# Patient Record
Sex: FEMALE | Race: WHITE | Employment: FULL TIME | URBAN - METROPOLITAN AREA
[De-identification: names, ages, dates, MRNs, and addresses within clinical notes are randomized per-mention and may not be internally consistent; named-entity substitution may affect disease eponyms.]

---

## 2018-06-13 ENCOUNTER — ANESTHESIA EVENT (OUTPATIENT)
Dept: OPERATING ROOM | Age: 38
End: 2018-06-13
Payer: COMMERCIAL

## 2018-06-13 ASSESSMENT — LIFESTYLE VARIABLES: SMOKING_STATUS: 1

## 2018-06-14 ENCOUNTER — HOSPITAL ENCOUNTER (OUTPATIENT)
Age: 38
Setting detail: OUTPATIENT SURGERY
Discharge: HOME | End: 2018-06-14
Attending: ORTHOPAEDIC SURGERY | Admitting: ORTHOPAEDIC SURGERY
Payer: COMMERCIAL

## 2018-06-14 ENCOUNTER — ANESTHESIA (OUTPATIENT)
Dept: OPERATING ROOM | Age: 38
End: 2018-06-14
Payer: COMMERCIAL

## 2018-06-14 VITALS
SYSTOLIC BLOOD PRESSURE: 128 MMHG | TEMPERATURE: 98 F | DIASTOLIC BLOOD PRESSURE: 75 MMHG | WEIGHT: 237 LBS | OXYGEN SATURATION: 98 % | BODY MASS INDEX: 40.46 KG/M2 | HEART RATE: 60 BPM | HEIGHT: 64 IN | RESPIRATION RATE: 16 BRPM

## 2018-06-14 VITALS
DIASTOLIC BLOOD PRESSURE: 61 MMHG | SYSTOLIC BLOOD PRESSURE: 109 MMHG | OXYGEN SATURATION: 95 % | TEMPERATURE: 98.6 F | RESPIRATION RATE: 15 BRPM

## 2018-06-14 DIAGNOSIS — M65.341 TRIGGER RING FINGER OF RIGHT HAND: Primary | ICD-10-CM

## 2018-06-14 PROBLEM — M65.349 TRIGGER RING FINGER: Status: ACTIVE | Noted: 2018-06-14

## 2018-06-14 PROCEDURE — 7100000010 HC PHASE II RECOVERY - FIRST 15 MIN: Performed by: ORTHOPAEDIC SURGERY

## 2018-06-14 PROCEDURE — 2709999900 HC NON-CHARGEABLE SUPPLY: Performed by: ORTHOPAEDIC SURGERY

## 2018-06-14 PROCEDURE — 7100000011 HC PHASE II RECOVERY - ADDTL 15 MIN: Performed by: ORTHOPAEDIC SURGERY

## 2018-06-14 PROCEDURE — 3700000000 HC ANESTHESIA ATTENDED CARE: Performed by: ORTHOPAEDIC SURGERY

## 2018-06-14 PROCEDURE — 6370000000 HC RX 637 (ALT 250 FOR IP): Performed by: ANESTHESIOLOGY

## 2018-06-14 PROCEDURE — 3600000002 HC SURGERY LEVEL 2 BASE: Performed by: ORTHOPAEDIC SURGERY

## 2018-06-14 PROCEDURE — 2500000003 HC RX 250 WO HCPCS: Performed by: NURSE ANESTHETIST, CERTIFIED REGISTERED

## 2018-06-14 PROCEDURE — 6360000002 HC RX W HCPCS: Performed by: NURSE ANESTHETIST, CERTIFIED REGISTERED

## 2018-06-14 PROCEDURE — 2580000003 HC RX 258: Performed by: ANESTHESIOLOGY

## 2018-06-14 PROCEDURE — 3600000012 HC SURGERY LEVEL 2 ADDTL 15MIN: Performed by: ORTHOPAEDIC SURGERY

## 2018-06-14 PROCEDURE — 3700000001 HC ADD 15 MINUTES (ANESTHESIA): Performed by: ORTHOPAEDIC SURGERY

## 2018-06-14 RX ORDER — FENTANYL CITRATE 50 UG/ML
25 INJECTION, SOLUTION INTRAMUSCULAR; INTRAVENOUS EVERY 5 MIN PRN
Status: DISCONTINUED | OUTPATIENT
Start: 2018-06-14 | End: 2018-06-14 | Stop reason: HOSPADM

## 2018-06-14 RX ORDER — ACETAMINOPHEN AND CODEINE PHOSPHATE 300; 30 MG/1; MG/1
1 TABLET ORAL EVERY 4 HOURS PRN
Qty: 30 TABLET | Refills: 1 | Status: SHIPPED | OUTPATIENT
Start: 2018-06-14 | End: 2018-06-24

## 2018-06-14 RX ORDER — LIDOCAINE HYDROCHLORIDE 5 MG/ML
INJECTION, SOLUTION INFILTRATION; INTRAVENOUS PRN
Status: DISCONTINUED | OUTPATIENT
Start: 2018-06-14 | End: 2018-06-14 | Stop reason: SDUPTHER

## 2018-06-14 RX ORDER — MIDAZOLAM HYDROCHLORIDE 1 MG/ML
INJECTION INTRAMUSCULAR; INTRAVENOUS PRN
Status: DISCONTINUED | OUTPATIENT
Start: 2018-06-14 | End: 2018-06-14 | Stop reason: SDUPTHER

## 2018-06-14 RX ORDER — PROMETHAZINE HYDROCHLORIDE 25 MG/ML
6.25 INJECTION, SOLUTION INTRAMUSCULAR; INTRAVENOUS
Status: DISCONTINUED | OUTPATIENT
Start: 2018-06-14 | End: 2018-06-14 | Stop reason: HOSPADM

## 2018-06-14 RX ORDER — FENTANYL CITRATE 50 UG/ML
50 INJECTION, SOLUTION INTRAMUSCULAR; INTRAVENOUS EVERY 5 MIN PRN
Status: DISCONTINUED | OUTPATIENT
Start: 2018-06-14 | End: 2018-06-14 | Stop reason: HOSPADM

## 2018-06-14 RX ORDER — KETOROLAC TROMETHAMINE 30 MG/ML
INJECTION, SOLUTION INTRAMUSCULAR; INTRAVENOUS PRN
Status: DISCONTINUED | OUTPATIENT
Start: 2018-06-14 | End: 2018-06-14 | Stop reason: SDUPTHER

## 2018-06-14 RX ORDER — LIDOCAINE HYDROCHLORIDE 20 MG/ML
INJECTION, SOLUTION INFILTRATION; PERINEURAL PRN
Status: DISCONTINUED | OUTPATIENT
Start: 2018-06-14 | End: 2018-06-14 | Stop reason: SDUPTHER

## 2018-06-14 RX ORDER — MEPERIDINE HYDROCHLORIDE 25 MG/ML
12.5 INJECTION INTRAMUSCULAR; INTRAVENOUS; SUBCUTANEOUS EVERY 5 MIN PRN
Status: DISCONTINUED | OUTPATIENT
Start: 2018-06-14 | End: 2018-06-14 | Stop reason: HOSPADM

## 2018-06-14 RX ORDER — PROPOFOL 10 MG/ML
INJECTION, EMULSION INTRAVENOUS CONTINUOUS PRN
Status: DISCONTINUED | OUTPATIENT
Start: 2018-06-14 | End: 2018-06-14 | Stop reason: SDUPTHER

## 2018-06-14 RX ORDER — ACETAMINOPHEN AND CODEINE PHOSPHATE 300; 30 MG/1; MG/1
1 TABLET ORAL
Status: COMPLETED | OUTPATIENT
Start: 2018-06-14 | End: 2018-06-14

## 2018-06-14 RX ORDER — FENTANYL CITRATE 50 UG/ML
INJECTION, SOLUTION INTRAMUSCULAR; INTRAVENOUS PRN
Status: DISCONTINUED | OUTPATIENT
Start: 2018-06-14 | End: 2018-06-14 | Stop reason: SDUPTHER

## 2018-06-14 RX ORDER — TRAMADOL HYDROCHLORIDE 50 MG/1
50 TABLET ORAL EVERY 6 HOURS PRN
Status: DISCONTINUED | OUTPATIENT
Start: 2018-06-14 | End: 2018-06-14 | Stop reason: HOSPADM

## 2018-06-14 RX ORDER — OXYCODONE HYDROCHLORIDE AND ACETAMINOPHEN 5; 325 MG/1; MG/1
1 TABLET ORAL
Status: DISCONTINUED | OUTPATIENT
Start: 2018-06-14 | End: 2018-06-14 | Stop reason: HOSPADM

## 2018-06-14 RX ORDER — SODIUM CHLORIDE, SODIUM LACTATE, POTASSIUM CHLORIDE, CALCIUM CHLORIDE 600; 310; 30; 20 MG/100ML; MG/100ML; MG/100ML; MG/100ML
INJECTION, SOLUTION INTRAVENOUS CONTINUOUS
Status: DISCONTINUED | OUTPATIENT
Start: 2018-06-14 | End: 2018-06-14 | Stop reason: HOSPADM

## 2018-06-14 RX ADMIN — LIDOCAINE HYDROCHLORIDE 25 MG: 20 INJECTION, SOLUTION INFILTRATION; PERINEURAL at 09:30

## 2018-06-14 RX ADMIN — MIDAZOLAM HYDROCHLORIDE 2 MG: 1 INJECTION INTRAMUSCULAR; INTRAVENOUS at 09:21

## 2018-06-14 RX ADMIN — FENTANYL CITRATE 50 MCG: 50 INJECTION, SOLUTION INTRAMUSCULAR; INTRAVENOUS at 09:25

## 2018-06-14 RX ADMIN — LIDOCAINE HYDROCHLORIDE 50 ML: 5 INJECTION, SOLUTION INFILTRATION; INTRAVENOUS at 09:26

## 2018-06-14 RX ADMIN — FENTANYL CITRATE 50 MCG: 50 INJECTION, SOLUTION INTRAMUSCULAR; INTRAVENOUS at 09:30

## 2018-06-14 RX ADMIN — SODIUM CHLORIDE, POTASSIUM CHLORIDE, SODIUM LACTATE AND CALCIUM CHLORIDE: 600; 310; 30; 20 INJECTION, SOLUTION INTRAVENOUS at 08:30

## 2018-06-14 RX ADMIN — ACETAMINOPHEN AND CODEINE PHOSPHATE 1 TABLET: 300; 30 TABLET ORAL at 10:15

## 2018-06-14 RX ADMIN — KETOROLAC TROMETHAMINE 30 MG: 30 INJECTION, SOLUTION INTRAMUSCULAR at 09:49

## 2018-06-14 RX ADMIN — PROPOFOL 100 MCG/KG/MIN: 10 INJECTION, EMULSION INTRAVENOUS at 09:30

## 2018-06-14 ASSESSMENT — PULMONARY FUNCTION TESTS
PIF_VALUE: 0

## 2018-06-14 ASSESSMENT — PAIN SCALES - GENERAL
PAINLEVEL_OUTOF10: 0
PAINLEVEL_OUTOF10: 6

## 2018-06-14 ASSESSMENT — PAIN DESCRIPTION - ORIENTATION
ORIENTATION: RIGHT

## 2018-06-14 ASSESSMENT — PAIN DESCRIPTION - PAIN TYPE
TYPE: SURGICAL PAIN

## 2018-06-14 ASSESSMENT — PAIN DESCRIPTION - DESCRIPTORS
DESCRIPTORS: ACHING
DESCRIPTORS: ACHING
DESCRIPTORS: ACHING;DISCOMFORT
DESCRIPTORS: ACHING

## 2018-06-14 ASSESSMENT — PAIN - FUNCTIONAL ASSESSMENT: PAIN_FUNCTIONAL_ASSESSMENT: 0-10

## 2018-06-14 ASSESSMENT — PAIN DESCRIPTION - LOCATION
LOCATION: HAND

## 2019-04-04 ENCOUNTER — HOSPITAL ENCOUNTER (EMERGENCY)
Age: 39
Discharge: HOME | End: 2019-04-04
Attending: EMERGENCY MEDICINE
Payer: COMMERCIAL

## 2019-04-04 VITALS
OXYGEN SATURATION: 96 % | BODY MASS INDEX: 40.85 KG/M2 | TEMPERATURE: 97.3 F | RESPIRATION RATE: 18 BRPM | SYSTOLIC BLOOD PRESSURE: 134 MMHG | HEART RATE: 79 BPM | WEIGHT: 238 LBS | DIASTOLIC BLOOD PRESSURE: 83 MMHG

## 2019-04-04 DIAGNOSIS — S09.90XA INJURY OF HEAD, INITIAL ENCOUNTER: Primary | ICD-10-CM

## 2019-04-04 PROCEDURE — 99282 EMERGENCY DEPT VISIT SF MDM: CPT

## 2019-04-04 PROCEDURE — 6370000000 HC RX 637 (ALT 250 FOR IP): Performed by: EMERGENCY MEDICINE

## 2019-04-04 RX ORDER — ACETAMINOPHEN 500 MG
1000 TABLET ORAL ONCE
Status: COMPLETED | OUTPATIENT
Start: 2019-04-04 | End: 2019-04-04

## 2019-04-04 RX ADMIN — ACETAMINOPHEN 1000 MG: 500 TABLET, FILM COATED ORAL at 11:24

## 2019-04-04 ASSESSMENT — ENCOUNTER SYMPTOMS
WHEEZING: 0
SINUS PRESSURE: 0
COUGH: 0
EYE DISCHARGE: 0
SHORTNESS OF BREATH: 0
ABDOMINAL DISTENTION: 0
VOMITING: 0
EYE REDNESS: 0
DOUBLE VISION: 0
BACK PAIN: 0
EYE PAIN: 0
BLURRED VISION: 0
DIARRHEA: 0
NAUSEA: 0
SORE THROAT: 0
DIFFICULTY BREATHING: 0

## 2019-04-04 ASSESSMENT — PAIN - FUNCTIONAL ASSESSMENT: PAIN_FUNCTIONAL_ASSESSMENT: PREVENTS OR INTERFERES SOME ACTIVE ACTIVITIES AND ADLS

## 2019-04-04 ASSESSMENT — PAIN SCALES - GENERAL
PAINLEVEL_OUTOF10: 7
PAINLEVEL_OUTOF10: 7

## 2019-04-04 ASSESSMENT — PAIN DESCRIPTION - PROGRESSION
CLINICAL_PROGRESSION: NOT CHANGED
CLINICAL_PROGRESSION: NOT CHANGED

## 2019-04-04 ASSESSMENT — PAIN DESCRIPTION - FREQUENCY: FREQUENCY: CONTINUOUS

## 2019-04-04 ASSESSMENT — PAIN DESCRIPTION - PAIN TYPE: TYPE: ACUTE PAIN

## 2019-04-04 ASSESSMENT — PAIN DESCRIPTION - LOCATION: LOCATION: HEAD

## 2019-04-04 ASSESSMENT — PAIN DESCRIPTION - DESCRIPTORS: DESCRIPTORS: ACHING;HEADACHE

## 2019-04-04 ASSESSMENT — PAIN DESCRIPTION - ORIENTATION: ORIENTATION: RIGHT;POSTERIOR

## 2019-04-04 NOTE — ED PROVIDER NOTES
The history is provided by the patient. Head Injury   Location:  R parietal  Time since incident:  1 day  Mechanism of injury: direct blow    Pain details:     Quality:  Aching and throbbing    Severity:  Moderate  Chronicity:  New  Associated symptoms: headache    Associated symptoms: no blurred vision, no difficulty breathing, no disorientation, no double vision, no hearing loss, no loss of consciousness, no memory loss, no nausea, no neck pain, no numbness, no seizures and no vomiting        Review of Systems   Constitutional: Negative for chills and fever. HENT: Negative for ear pain, hearing loss, sinus pressure and sore throat. Eyes: Negative for blurred vision, double vision, pain, discharge and redness. Respiratory: Negative for cough, shortness of breath and wheezing. Cardiovascular: Negative for chest pain. Gastrointestinal: Negative for abdominal distention, diarrhea, nausea and vomiting. Genitourinary: Negative for dysuria and frequency. Musculoskeletal: Negative for arthralgias, back pain and neck pain. Skin: Negative for rash and wound. Neurological: Positive for headaches. Negative for seizures, loss of consciousness, weakness and numbness. Hematological: Negative for adenopathy. Psychiatric/Behavioral: Negative for memory loss. All other systems reviewed and are negative. Physical Exam   Constitutional: She is oriented to person, place, and time. She appears well-developed and well-nourished. HENT:   Head: Normocephalic. Head is with contusion. Eyes: Pupils are equal, round, and reactive to light. Neck: Normal range of motion. Neck supple. Cardiovascular: Normal rate, regular rhythm and normal heart sounds. No murmur heard. Pulmonary/Chest: Effort normal and breath sounds normal. No respiratory distress. She has no wheezes. She has no rales. Abdominal: Soft. Bowel sounds are normal. There is no tenderness. There is no rebound and no guarding. Musculoskeletal: She exhibits no edema. Neurological: She is alert and oriented to person, place, and time. No cranial nerve deficit. Coordination normal.   Skin: Skin is warm and dry. Nursing note and vitals reviewed. Procedures    [facility]             --------------------------------------------- PAST HISTORY ---------------------------------------------  Past Medical History:  has a past medical history of H/O dental abscess. Past Surgical History:  has a past surgical history that includes  section; Dental surgery; other surgical history (Right, 2018); and pr incise finger tendon sheath (Right, 2018). Social History:  reports that she has been smoking cigarettes. She has been smoking about 1.00 pack per day. She has never used smokeless tobacco. She reports that she does not drink alcohol or use drugs. Family History: family history is not on file. The patients home medications have been reviewed. Allergies: Bactrim [sulfamethoxazole-trimethoprim]; Other; Penicillins; Ciprofloxacin; and Keflex [cephalexin]    -------------------------------------------------- RESULTS -------------------------------------------------  Labs:  No results found for this visit on 19. Radiology:  No orders to display       ------------------------- NURSING NOTES AND VITALS REVIEWED ---------------------------  Date / Time Roomed:  2019 10:53 AM  ED Bed Assignment:      The nursing notes within the ED encounter and vital signs as below have been reviewed.    /83   Pulse 79   Temp 97.3 °F (36.3 °C) (Oral)   Resp 18   Wt 238 lb (108 kg)   LMP 2019   SpO2 96%   BMI 40.85 kg/m²   Oxygen Saturation Interpretation: Normal      ------------------------------------------ PROGRESS NOTES ------------------------------------------  I have spoken with the patient and discussed todays results, in addition to providing specific details for the plan of care and counseling

## 2019-04-04 NOTE — LETTER
[facility] [address] [place] [address] Emergency Department  [address] [address]  Phone: [phone]               April 4, 2019    Patient: Rohan Rhodes   YOB: 1980   Date of Visit: 4/4/2019       To Whom It May Concern:    Rohan Rhodes was seen and treated in our emergency department on 4/4/2019. She may return to work on 4/8/2019.       Sincerely,       Nolon Carrel, RN         Signature:__________________________________

## 2022-12-29 ENCOUNTER — OFFICE VISIT (OUTPATIENT)
Dept: ORTHOPEDIC SURGERY | Age: 42
End: 2022-12-29
Payer: COMMERCIAL

## 2022-12-29 VITALS — HEIGHT: 64 IN | WEIGHT: 228 LBS | TEMPERATURE: 98 F | BODY MASS INDEX: 38.93 KG/M2

## 2022-12-29 DIAGNOSIS — S82.891A CLOSED FRACTURE OF RIGHT ANKLE, INITIAL ENCOUNTER: Primary | ICD-10-CM

## 2022-12-29 DIAGNOSIS — S93.401A SPRAIN OF RIGHT ANKLE, UNSPECIFIED LIGAMENT, INITIAL ENCOUNTER: ICD-10-CM

## 2022-12-29 PROCEDURE — G8484 FLU IMMUNIZE NO ADMIN: HCPCS | Performed by: ORTHOPAEDIC SURGERY

## 2022-12-29 PROCEDURE — 4004F PT TOBACCO SCREEN RCVD TLK: CPT | Performed by: ORTHOPAEDIC SURGERY

## 2022-12-29 PROCEDURE — 99203 OFFICE O/P NEW LOW 30 MIN: CPT | Performed by: ORTHOPAEDIC SURGERY

## 2022-12-29 PROCEDURE — G8417 CALC BMI ABV UP PARAM F/U: HCPCS | Performed by: ORTHOPAEDIC SURGERY

## 2022-12-29 PROCEDURE — G8427 DOCREV CUR MEDS BY ELIG CLIN: HCPCS | Performed by: ORTHOPAEDIC SURGERY

## 2022-12-29 RX ORDER — ASPIRIN 81 MG/1
81 TABLET ORAL DAILY
Qty: 30 TABLET | Refills: 0 | Status: SHIPPED | OUTPATIENT
Start: 2022-12-29

## 2022-12-29 NOTE — PROGRESS NOTES
Chief Complaint   Patient presents with    Ankle Pain       Alfredo Baca is a 38 y.o. female who presents today with a right ankle injury. The injury occurred 2022. Patient reports: Overusing her ankle and waking up the next morning and pain. The patient complains of pain over the medial side. The intensity is 4/10. The pain is described as: dull. Previous treatment includes:rest, ice, heat, NSAIDs, HEP without much relief.         Past Medical History:   Diagnosis Date    H/O dental abscess      Past Surgical History:   Procedure Laterality Date     SECTION      DENTAL SURGERY      OTHER SURGICAL HISTORY Right 2018    tenovaginotomy, right ring finger    VT INCISE FINGER TENDON SHEATH Right 2018    TENOVAGINOTOMY RIGHT RING FINGER performed by Keaton Liu DO at [address] [place] [address]       Current Outpatient Medications:     aspirin EC 81 MG EC tablet, Take 1 tablet by mouth daily, Disp: 30 tablet, Rfl: 0    escitalopram (LEXAPRO) 20 MG tablet, Take 20 mg by mouth daily, Disp: , Rfl:   Allergies   Allergen Reactions    Bactrim [Sulfamethoxazole-Trimethoprim] Anaphylaxis    Other Anaphylaxis     Seafood      Penicillins Anaphylaxis    Ciprofloxacin Hives    Keflex [Cephalexin] Hives     Social History     Socioeconomic History    Marital status:      Spouse name: Not on file    Number of children: Not on file    Years of education: Not on file    Highest education level: Not on file   Occupational History    Not on file   Tobacco Use    Smoking status: Every Day     Packs/day: 1.00     Types: Cigarettes    Smokeless tobacco: Never   Substance and Sexual Activity    Alcohol use: No    Drug use: No    Sexual activity: Yes     Partners: Male   Other Topics Concern    Not on file   Social History Narrative    Not on file     Social Determinants of Health     Financial Resource Strain: Not on file   Food Insecurity: Not on file   Transportation Needs: Not on file   Physical Activity: Not on file   Stress: Not on file   Social Connections: Not on file   Intimate Partner Violence: Not on file   Housing Stability: Not on file     No family history on file. REVIEW OF SYSTEMS:     General/Constitution:  (-)weight loss, (-)fever, (-)chills, (-)weakness. Skin: (-) rash,(-) psoriasis,(-) eczema, (-)skin cancer. Musculoskeletal: (-) fractures,  (-) dislocations,(-) collagen vascular disease, (-) fibromyalgia, (-) multiple sclerosis, (-) muscular dystrophy, (-) RSD,(-) joint pain (-)swelling, (-) joint pain,swelling. Neurologic: (-) epilepsy, (-)seizures,(-) brain tumor,(-) TIA, (-)stroke, (-)headaches, (-)Parkinson disease,(-) memory loss, (-) LOC. Cardiovascular: (-) Chest pain, (-) swelling in legs/feet, (-) SOB, (-) cramping in legs/feet with walking. Respiratory: (-) SOB, (-) Coughing, (-) night sweats. GI: (-) nausea, (-) vomiting, (-) diarrhea, (-) blood in stool, (-) gastric ulcer. Psychiatric: (-) Depression, (-) Anxiety, (-) bipolar disease, (-) Alzheimer's Disease  Allergic/Immunologic: (-) allergies latex, (-) allergies metal, (-) skin sensitivity. Hematlogic: (-) anemia, (-) blood transfusion, (-) DVT/PE, (-) Clotting disorders      EXAM:      Constitution:  Vitals:    12/29/22 1024   Temp: 98 °F (36.7 °C)         Psycihatric:    The patient is alert and oriented x 3, appears to be stated age and in no distress. Respiratory:    Respiratory effort is not labored. Patient is not gasping. Palpation of the chest reveals no tactile fremitus. Skin:    Upon inspection: the skin appears warm, dry and intact. There is not a previous scar over the affected area. There is not any cellulitis, lymphedema or cutaneous lesions noted in the lower extremities. Upon palpation there is no induration noted. Neurologic:      Motor exam of the lower extremities show ; quadriceps, hamstrings, foot dorsi and plantar flexors intact R.  5/5 and L. 5/5.  Deep tendon reflexes are 2/4 at the knees and 2/4 at the ankles with strong extensor hallicus longus motor strength bilaterally. Sensory to both feet is intact to all sensory roots. Cardiovascular: The vascular exam is normal and is well perfused to distal extremities. Distal pulses DP/PT: R. 2+; L. 2+. There is cap refill noted less than two seconds in all digits. There is not edema of the bilateral lower extremities. There is not varicosities noted in the distal extremities. Lymph:    Upon palpation,  there is no lymphadenopathy noted in bilateral lower extremities. Musculoskeletal:    Gait: antalgic; examination of the nails and digits reveal no cyanosis or clubbing. Knee exam - bilateral knee exam shows;  range of motion of R. Knee is 0 to 140, and L. Knee is 0 to 140. The patient does not have  pain on motion, there is not an effusion, there is not tenderness over the  global region, there are not any masses, there is not ligamentous instability, there is not  deformity noted. Knee exam: the injured knee reveals normal exam, no swelling, tenderness, instability; ligaments intact, FROM. Ankle Exam:    Upon inspection and palpation of the Right ankle,  there is not deformity noted,  moderate swelling, moderate ecchymosis, has pain on palpation of right medial malleolus. ROM R: Limited Secondary to pain; Left ankle : DF20; PF 40;  INV 30, DEMOND 10. This exam was compared bilaterally. Right Ankle:   (-) Anterior Drawer ,  (-) Posterior Drawer ,  (-) Squeeze test,  (-) External Rotation, (-) Eversion test , (-) Liz Test     Left ankle:   (-) Anterior Drawer ,(-)  Posterior Drawer ,(-) Squeeze test,(-) External Rotation (-) Eversion test, (-) Liz Test.      Foot exam- visual inspection reveals warm, good capillary refill, there is not pain to palpation over the metatarsals.    ROM inversion/eversion full range of motion, abduction/adduction full range of motion, ROM in MTP/PIP/DIP full range of motion. Xrays:    X-rays of right ankle from outside facility reveal a small avulsion fracture of the medial malleolus      Radiographic findings reviewed with patient      Impression:  Gavi Anderson was seen today for ankle pain. Diagnoses and all orders for this visit:    Closed fracture of right ankle, initial encounter    Sprain of right ankle, unspecified ligament, initial encounter    Other orders  -     aspirin EC 81 MG EC tablet; Take 1 tablet by mouth daily        Patient seen and examined. X-rays reviewed. Natural history and course discussed with patient. Treatment options discussed with patient in detail including risks and benefits. Patient should do well with conservative management at this time. Plan:Natural history and expected course discussed. Questions answered. Rest, ice, compression, elevation (RICE) therapy. Crutches and instructions provided. Educational materials distributed. Fit with long leg cast boot for use over next 2-4 weeks. Home exercise plan outlined. OTC analgesics as needed.   PT referral.  Aspirin for DVT prophylaxis    F/u 2 weeks

## 2023-01-09 DIAGNOSIS — S82.891A CLOSED FRACTURE OF RIGHT ANKLE, INITIAL ENCOUNTER: Primary | ICD-10-CM

## 2023-01-12 ENCOUNTER — OFFICE VISIT (OUTPATIENT)
Dept: ORTHOPEDIC SURGERY | Age: 43
End: 2023-01-12

## 2023-01-12 VITALS — WEIGHT: 228 LBS | BODY MASS INDEX: 38.93 KG/M2 | TEMPERATURE: 98 F | HEIGHT: 64 IN

## 2023-01-12 DIAGNOSIS — S93.401A SPRAIN OF RIGHT ANKLE, UNSPECIFIED LIGAMENT, INITIAL ENCOUNTER: ICD-10-CM

## 2023-01-12 DIAGNOSIS — S82.891A CLOSED FRACTURE OF RIGHT ANKLE, INITIAL ENCOUNTER: Primary | ICD-10-CM

## 2023-01-12 NOTE — PROGRESS NOTES
Chief Complaint   Patient presents with    Ankle Pain     Left ankle fx f/u doing greatSourav Isbell is a 38 y.o. female who presents today with a right ankle injury. The injury occurred 2022. Patient reports: Overusing her ankle and waking up the next morning and pain. The patient complains of pain over the medial side. The intensity is 4/10. The pain is described as: dull. Previous treatment includes:rest, ice, heat, NSAIDs, HEP without much relief. On follow-up today, patient has been compliant with cast boot and has no pain.         Past Medical History:   Diagnosis Date    H/O dental abscess      Past Surgical History:   Procedure Laterality Date     SECTION      DENTAL SURGERY      OTHER SURGICAL HISTORY Right 2018    tenovaginotomy, right ring finger    MO INCISE FINGER TENDON SHEATH Right 2018    TENOVAGINOTOMY RIGHT RING FINGER performed by Jignesh Rubin DO at [address] [place] [address]       Current Outpatient Medications:     aspirin EC 81 MG EC tablet, Take 1 tablet by mouth daily, Disp: 30 tablet, Rfl: 0    escitalopram (LEXAPRO) 20 MG tablet, Take 20 mg by mouth daily, Disp: , Rfl:   Allergies   Allergen Reactions    Bactrim [Sulfamethoxazole-Trimethoprim] Anaphylaxis    Other Anaphylaxis     Seafood      Penicillins Anaphylaxis    Ciprofloxacin Hives    Keflex [Cephalexin] Hives     Social History     Socioeconomic History    Marital status:      Spouse name: Not on file    Number of children: Not on file    Years of education: Not on file    Highest education level: Not on file   Occupational History    Not on file   Tobacco Use    Smoking status: Every Day     Packs/day: 1.00     Types: Cigarettes    Smokeless tobacco: Never   Substance and Sexual Activity    Alcohol use: No    Drug use: No    Sexual activity: Yes     Partners: Male   Other Topics Concern    Not on file   Social History Narrative    Not on file     Social Determinants of Health Financial Resource Strain: Not on file   Food Insecurity: Not on file   Transportation Needs: Not on file   Physical Activity: Not on file   Stress: Not on file   Social Connections: Not on file   Intimate Partner Violence: Not on file   Housing Stability: Not on file     No family history on file. REVIEW OF SYSTEMS:     General/Constitution:  (-)weight loss, (-)fever, (-)chills, (-)weakness. Skin: (-) rash,(-) psoriasis,(-) eczema, (-)skin cancer. Musculoskeletal: (-) fractures,  (-) dislocations,(-) collagen vascular disease, (-) fibromyalgia, (-) multiple sclerosis, (-) muscular dystrophy, (-) RSD,(-) joint pain (-)swelling, (-) joint pain,swelling. Neurologic: (-) epilepsy, (-)seizures,(-) brain tumor,(-) TIA, (-)stroke, (-)headaches, (-)Parkinson disease,(-) memory loss, (-) LOC. Cardiovascular: (-) Chest pain, (-) swelling in legs/feet, (-) SOB, (-) cramping in legs/feet with walking. Respiratory: (-) SOB, (-) Coughing, (-) night sweats. GI: (-) nausea, (-) vomiting, (-) diarrhea, (-) blood in stool, (-) gastric ulcer. Psychiatric: (-) Depression, (-) Anxiety, (-) bipolar disease, (-) Alzheimer's Disease  Allergic/Immunologic: (-) allergies latex, (-) allergies metal, (-) skin sensitivity. Hematlogic: (-) anemia, (-) blood transfusion, (-) DVT/PE, (-) Clotting disorders      EXAM:      Constitution:  Vitals:    01/12/23 0836   Temp: 98 °F (36.7 °C)         Psycihatric:    The patient is alert and oriented x 3, appears to be stated age and in no distress. Respiratory:    Respiratory effort is not labored. Patient is not gasping. Palpation of the chest reveals no tactile fremitus. Skin:    Upon inspection: the skin appears warm, dry and intact. There is not a previous scar over the affected area. There is not any cellulitis, lymphedema or cutaneous lesions noted in the lower extremities. Upon palpation there is no induration noted.           Neurologic:      Motor exam of the lower extremities show ; quadriceps, hamstrings, foot dorsi and plantar flexors intact R.  5/5 and L. 5/5. Deep tendon reflexes are 2/4 at the knees and 2/4 at the ankles with strong extensor hallicus longus motor strength bilaterally. Sensory to both feet is intact to all sensory roots. Cardiovascular: The vascular exam is normal and is well perfused to distal extremities. Distal pulses DP/PT: R. 2+; L. 2+. There is cap refill noted less than two seconds in all digits. There is not edema of the bilateral lower extremities. There is not varicosities noted in the distal extremities. Lymph:    Upon palpation,  there is no lymphadenopathy noted in bilateral lower extremities. Musculoskeletal:    Gait: antalgic; examination of the nails and digits reveal no cyanosis or clubbing. Knee exam - bilateral knee exam shows;  range of motion of R. Knee is 0 to 140, and L. Knee is 0 to 140. The patient does not have  pain on motion, there is not an effusion, there is not tenderness over the  global region, there are not any masses, there is not ligamentous instability, there is not  deformity noted. Knee exam: the injured knee reveals normal exam, no swelling, tenderness, instability; ligaments intact, FROM. Ankle Exam:    Upon inspection and palpation of the Right ankle,  there is not deformity noted, mild swelling, no ecchymosis, has no pain on palpation of right medial malleolus. ROM R: DF20; PF 40;  INV 30, DEMOND 10. This exam was compared bilaterally. Right Ankle:   (-) Anterior Drawer ,  (-) Posterior Drawer ,  (-) Squeeze test,  (-) External Rotation, (-) Eversion test , (-) Liz Test     Left ankle:   (-) Anterior Drawer ,(-)  Posterior Drawer ,(-) Squeeze test,(-) External Rotation (-) Eversion test, (-) Liz Test.      Foot exam- visual inspection reveals warm, good capillary refill, there is not pain to palpation over the metatarsals.    ROM inversion/eversion full range of motion, abduction/adduction full range of motion, ROM in MTP/PIP/DIP full range of motion. Xrays:    X-rays of right ankle from outside facility reveal a small avulsion fracture of the medial malleolus      XR ANKLE RIGHT (MIN 3 VIEWS)    Result Date: 1/12/2023  EXAMINATION: THREE XRAY VIEWS OF THE RIGHT ANKLE 1/12/2023 8:23 am COMPARISON: None used HISTORY: ORDERING SYSTEM PROVIDED HISTORY: Closed fracture of right ankle, initial encounter FINDINGS: Radiographs of the right ankle demonstrate no fractures with preserved alignment. Ankle mortise appears preserved. No abnormality is identified within the talar dome nor within the base of the 5th metatarsal.  Tiny anterior calcaneal enthesophyte. Osseous mineralization is normal. No soft tissue swelling or joint effusion. 1.  No acute osseous findings seen about the right ankle. Normal alignment. 2.  Tiny anterior calcaneal enthesophyte. RECOMMENDATION: In the setting of recent trauma, if there is persistent symptoms and physical exam warrants a repeat radiograph in 10-14 days could be considered as occult fractures may not be evident on initial imaging evaluation. Radiographic findings reviewed with patient      Impression:  Pat Durand was seen today for ankle pain. Diagnoses and all orders for this visit:    Closed fracture of right ankle, initial encounter    Sprain of right ankle, unspecified ligament, initial encounter          Patient seen and examined. X-rays reviewed. Natural history and course discussed with patient. Treatment options discussed with patient in detail including risks and benefits. Patient should do well with conservative management at this time. Plan:Natural history and expected course discussed. Questions answered. Rest, ice, compression, elevation (RICE) therapy. Crutches and instructions provided. Educational materials distributed. Fit with ankle brace for use over next 2-4 weeks. Home exercise plan outlined.   OTC analgesics as needed. In a 15 minute assessment and discussion, patient was provided and fitted for a removable lace up ankle brace distributed and applied. She was educated in detail how to use brace and the importance of use as well as range of motion and HEP exercises.

## 2023-01-21 RX ORDER — ACETAMINOPHEN/DIPHENHYDRAMINE 500MG-25MG
TABLET ORAL
Qty: 30 TABLET | Refills: 0 | OUTPATIENT
Start: 2023-01-21

## 2023-02-08 DIAGNOSIS — S82.891A CLOSED FRACTURE OF RIGHT ANKLE, INITIAL ENCOUNTER: Primary | ICD-10-CM

## 2023-02-13 DIAGNOSIS — S82.891A CLOSED FRACTURE OF RIGHT ANKLE, INITIAL ENCOUNTER: Primary | ICD-10-CM

## 2023-10-02 ENCOUNTER — OFFICE VISIT (OUTPATIENT)
Dept: ORTHOPEDIC SURGERY | Age: 43
End: 2023-10-02
Payer: COMMERCIAL

## 2023-10-02 VITALS — BODY MASS INDEX: 38.93 KG/M2 | HEIGHT: 64 IN | WEIGHT: 228 LBS | TEMPERATURE: 98 F

## 2023-10-02 DIAGNOSIS — S62.112A CLOSED CHIP FRACTURE OF TRIQUETRUM OF LEFT WRIST, INITIAL ENCOUNTER: Primary | ICD-10-CM

## 2023-10-02 PROCEDURE — 4004F PT TOBACCO SCREEN RCVD TLK: CPT | Performed by: ORTHOPAEDIC SURGERY

## 2023-10-02 PROCEDURE — G8417 CALC BMI ABV UP PARAM F/U: HCPCS | Performed by: ORTHOPAEDIC SURGERY

## 2023-10-02 PROCEDURE — G8427 DOCREV CUR MEDS BY ELIG CLIN: HCPCS | Performed by: ORTHOPAEDIC SURGERY

## 2023-10-02 PROCEDURE — 99203 OFFICE O/P NEW LOW 30 MIN: CPT | Performed by: ORTHOPAEDIC SURGERY

## 2023-10-02 PROCEDURE — G8484 FLU IMMUNIZE NO ADMIN: HCPCS | Performed by: ORTHOPAEDIC SURGERY

## 2023-10-02 NOTE — PROGRESS NOTES
Angella Morales is a 37 y.o. female, who presents   Chief Complaint   Patient presents with    Hand Pain     Left hand fx hit it on the floor board while picking up clothes. HPI[de-identified] Injury to left wrist occurred 4 days ago. Jaydon aMckay was doing some laundry and she moved her left hand and hit the bed rail with her left wrist area. She was sore and went to the urgent care at [facility] the next day. She was x-rayed there and  doctor waited for radiologist report reported to her that she had a chip fracture in her wrist.  She is placed in a short arm splint. She is still sore in the area and has some difficulty with finger range of motion. Allergies; medications; past medical, surgical, family, and social history; and problem list have been reviewed today and updated as indicated in this encounter - see below following Ortho specifics. Musculoskeletal: Skin condition gross neurovascular functions good in the left upper extremity. Elbow and shoulder range of motion stability are good without pain. The left wrist is guarded because of pain. There is a little stiffness and swelling. There is no discoloration at this time. Finger range of motion is somewhat limited in composite flexion by her discomfort. She is pliable at the endpoints observed. Radiologic Studies: Imaging from the urgent care shows on the lateral film and ossific density dorsal over the carpus which is consistent with fracture of the triquetrium which is usually a small dorsal avulsion fracture. No other bony abnormalities are noted. ASSESSMENT:  Jaydon Mackay was seen today for hand pain.     Diagnoses and all orders for this visit:    Closed chip fracture of triquetrum of left wrist, initial encounter     Treatment alternatives were reviewed including medical and physical therapies, injections, and surgical options, expected risks benefits and likely outcome of each were discussed in detail, questions asked and

## 2023-10-23 ENCOUNTER — OFFICE VISIT (OUTPATIENT)
Dept: ORTHOPEDIC SURGERY | Age: 43
End: 2023-10-23
Payer: COMMERCIAL

## 2023-10-23 VITALS — TEMPERATURE: 98 F | HEIGHT: 64 IN | WEIGHT: 228 LBS | BODY MASS INDEX: 38.93 KG/M2

## 2023-10-23 DIAGNOSIS — S62.112A CLOSED CHIP FRACTURE OF TRIQUETRUM OF LEFT WRIST, INITIAL ENCOUNTER: Primary | ICD-10-CM

## 2023-10-23 PROCEDURE — G8417 CALC BMI ABV UP PARAM F/U: HCPCS | Performed by: ORTHOPAEDIC SURGERY

## 2023-10-23 PROCEDURE — G8427 DOCREV CUR MEDS BY ELIG CLIN: HCPCS | Performed by: ORTHOPAEDIC SURGERY

## 2023-10-23 PROCEDURE — 4004F PT TOBACCO SCREEN RCVD TLK: CPT | Performed by: ORTHOPAEDIC SURGERY

## 2023-10-23 PROCEDURE — G8484 FLU IMMUNIZE NO ADMIN: HCPCS | Performed by: ORTHOPAEDIC SURGERY

## 2023-10-23 PROCEDURE — 99213 OFFICE O/P EST LOW 20 MIN: CPT | Performed by: ORTHOPAEDIC SURGERY

## 2023-10-23 NOTE — PROGRESS NOTES
Chief Complaint:   Chief Complaint   Patient presents with    Hand Pain     Left hand fx f/u just has pain in the ring finger. [employer]. 3 and half weeks post injury to her left hand. He still has some discomfort and she indicates more over the fourth metacarpal.  She says she was told at urgent care that she had a fracture metacarpal.  Apparently was some question but they awaited for radiologist report. We do not have that available here. Allergies; medications; past medical, surgical, family, and social history; and problem list have been reviewed today and updated as indicated in this encounter seen below. Exam: There is tenderness dorsally over the fourth metacarpal of the left hand. Is also some discomfort over the triquetral area dorsally. Range of motion is fairly good in the wrist with slight endrange limitations. She has good full finger composite flexion. Strength is fair. There is no edema. Tendon function is all intact    Radiographs: Imaging of the left hand views shows no obvious metacarpal fracture. All bones are well aligned. ASSESSMENT:    Kaylyn uMrray was seen today for hand pain. Diagnoses and all orders for this visit:    Closed chip fracture of triquetrum of left wrist, initial encounter  -     XR HAND LEFT (MIN 3 VIEWS); Future        PLAN: Would like to use the brace as needed. She asked about returning to work as a  but this would pose an appropriate risk. We will follow-up in 2 weeks and check function and pain level with that particular goal in mind. Return in about 2 weeks (around 11/6/2023). Current Outpatient Medications   Medication Sig Dispense Refill    escitalopram (LEXAPRO) 20 MG tablet Take 1 tablet by mouth daily       No current facility-administered medications for this visit.        Patient Active Problem List   Diagnosis    Trigger ring finger       Past Medical History:   Diagnosis Date    H/O dental abscess        Past

## 2023-11-08 ENCOUNTER — OFFICE VISIT (OUTPATIENT)
Dept: ORTHOPEDIC SURGERY | Age: 43
End: 2023-11-08
Payer: COMMERCIAL

## 2023-11-08 VITALS — BODY MASS INDEX: 38.93 KG/M2 | WEIGHT: 228 LBS | HEIGHT: 64 IN | TEMPERATURE: 98 F

## 2023-11-08 DIAGNOSIS — S62.112A CLOSED CHIP FRACTURE OF TRIQUETRUM OF LEFT WRIST, INITIAL ENCOUNTER: Primary | ICD-10-CM

## 2023-11-08 PROCEDURE — 99213 OFFICE O/P EST LOW 20 MIN: CPT | Performed by: ORTHOPAEDIC SURGERY

## 2023-11-08 PROCEDURE — 4004F PT TOBACCO SCREEN RCVD TLK: CPT | Performed by: ORTHOPAEDIC SURGERY

## 2023-11-08 PROCEDURE — G8417 CALC BMI ABV UP PARAM F/U: HCPCS | Performed by: ORTHOPAEDIC SURGERY

## 2023-11-08 PROCEDURE — G8427 DOCREV CUR MEDS BY ELIG CLIN: HCPCS | Performed by: ORTHOPAEDIC SURGERY

## 2023-11-08 PROCEDURE — G8484 FLU IMMUNIZE NO ADMIN: HCPCS | Performed by: ORTHOPAEDIC SURGERY

## 2023-11-08 NOTE — PROGRESS NOTES
Chief Complaint:   Chief Complaint   Patient presents with    Hand Pain     Left Hand, FX F/U DOI 10/02/2023, wants to be released back to work. Alyes Must follows up about 6 weeks post injury to her left wrist.  She is doing very well. She is ready to go back to work. Allergies; medications; past medical, surgical, family, and social history; and problem list have been reviewed today and updated as indicated in this encounter seen below. Exam: Range of motion of the left wrist is full. There is no tenderness to palpation. There is no edema and no discoloration or increase in local temperature. She has full finger motion and good strength in the hand. Radiographs: None    ASSESSMENT:    Sinai Boyce was seen today for hand pain. Diagnoses and all orders for this visit:    Closed chip fracture of triquetrum of left wrist, initial encounter        PLAN: Return to work without restrictions    Return if symptoms worsen or fail to improve. Current Outpatient Medications   Medication Sig Dispense Refill    escitalopram (LEXAPRO) 20 MG tablet Take 1 tablet by mouth daily       No current facility-administered medications for this visit.        Patient Active Problem List   Diagnosis    Trigger ring finger       Past Medical History:   Diagnosis Date    H/O dental abscess        Past Surgical History:   Procedure Laterality Date     SECTION      DENTAL SURGERY      OTHER SURGICAL HISTORY Right 2018    tenovaginotomy, right ring finger    MD TENDON SHEATH INCISION Right 2018    TENOVAGINOTOMY RIGHT RING FINGER performed by Bertrand Maldonado DO at [address] [place] [address] L       Allergies   Allergen Reactions    Bactrim [Sulfamethoxazole-Trimethoprim] Anaphylaxis    Other Anaphylaxis     Seafood      Penicillins Anaphylaxis    Ciprofloxacin Hives    Keflex [Cephalexin] Hives       Social History     Socioeconomic History    Marital status:      Spouse name: None    Number of

## 2024-02-19 ENCOUNTER — HOSPITAL ENCOUNTER (OUTPATIENT)
Age: 44
Discharge: HOME | End: 2024-02-19
Payer: COMMERCIAL

## 2024-02-19 LAB
SEND OUT REPORT: NORMAL
TEST NAME: NORMAL

## 2024-02-19 PROCEDURE — 36415 COLL VENOUS BLD VENIPUNCTURE: CPT

## 2024-02-19 PROCEDURE — 82785 ASSAY OF IGE: CPT

## 2024-02-19 PROCEDURE — 86003 ALLG SPEC IGE CRUDE XTRC EA: CPT

## 2024-02-24 LAB
SEND OUT REPORT: NORMAL
TEST NAME: NORMAL

## 2024-03-07 LAB
SEND OUT REPORT: NORMAL
TEST NAME: NORMAL

## 2024-05-27 ENCOUNTER — HOSPITAL ENCOUNTER (EMERGENCY)
Age: 44
Discharge: HOME | End: 2024-05-27
Payer: COMMERCIAL

## 2024-05-27 ENCOUNTER — APPOINTMENT (OUTPATIENT)
Dept: GENERAL RADIOLOGY | Age: 44
End: 2024-05-27
Payer: COMMERCIAL

## 2024-05-27 VITALS
BODY MASS INDEX: 37.93 KG/M2 | DIASTOLIC BLOOD PRESSURE: 83 MMHG | RESPIRATION RATE: 18 BRPM | WEIGHT: 221 LBS | HEART RATE: 86 BPM | SYSTOLIC BLOOD PRESSURE: 150 MMHG | TEMPERATURE: 97.8 F | OXYGEN SATURATION: 94 %

## 2024-05-27 DIAGNOSIS — S93.402A SPRAIN OF LEFT ANKLE, UNSPECIFIED LIGAMENT, INITIAL ENCOUNTER: Primary | ICD-10-CM

## 2024-05-27 PROCEDURE — 99211 OFF/OP EST MAY X REQ PHY/QHP: CPT

## 2024-05-27 PROCEDURE — 73610 X-RAY EXAM OF ANKLE: CPT

## 2024-05-27 RX ORDER — OMEPRAZOLE 20 MG/1
20 CAPSULE, DELAYED RELEASE ORAL DAILY
COMMUNITY

## 2024-05-27 RX ORDER — LEVOTHYROXINE SODIUM 0.03 MG/1
25 TABLET ORAL DAILY
COMMUNITY

## 2024-05-27 NOTE — ED PROVIDER NOTES
[facility] URGENT CARE  EMERGENCY DEPARTMENT ENCOUNTER        NAME: Soledad Dixon  :  1980  MRN:  [id number]  Date of evaluation: 2024  Provider: Rigo Long PA-C  PCP: Karlos Dang Jr., DO  Note Started : 6:23 PM EDT 24    Chief Complaint: Ankle Pain (Friday pt stated she rolled her left ankle off of steps. Swelling present per pt. )      This is a 44-year-old female who presents to urgent care with family.  Patient states about 3 days ago she was carrying objects and was walking on a cinderblock and it rolled over twisting her ankle as well.  She denies any knee or hip pain.  No head or neck pain.  On first contact patient she appears to be in no acute distress.        Review of Systems  Pertinent positives and negatives are stated within HPI, all other systems reviewed and are negative.     Allergies: Bactrim [sulfamethoxazole-trimethoprim], Other, Penicillins, Ciprofloxacin, and Keflex [cephalexin]     --------------------------------------------- PAST HISTORY ---------------------------------------------  Past Medical History:  has a past medical history of H/O dental abscess.    Past Surgical History:  has a past surgical history that includes  section; Dental surgery; other surgical history (Right, 2018); and pr tendon sheath incision (Right, 2018).    Social History:  reports that she has been smoking cigarettes. She has never used smokeless tobacco. She reports that she does not drink alcohol and does not use drugs.    Family History: family history is not on file.     The patient’s home medications have been reviewed.    The nursing notes within the ED encounter have been reviewed.     ------------------------------------------------SCREENINGS----------------------------------------------                        CIWA Assessment  BP: (!) 150/83  Pulse: 86           ---------------------------------------------PHYSICAL EXAM

## 2024-06-17 ENCOUNTER — OFFICE VISIT (OUTPATIENT)
Dept: ORTHOPEDIC SURGERY | Age: 44
End: 2024-06-17
Payer: COMMERCIAL

## 2024-06-17 VITALS — BODY MASS INDEX: 37.73 KG/M2 | TEMPERATURE: 98 F | WEIGHT: 221 LBS | HEIGHT: 64 IN

## 2024-06-17 DIAGNOSIS — M25.562 ACUTE PAIN OF LEFT KNEE: Primary | ICD-10-CM

## 2024-06-17 DIAGNOSIS — S86.812A: ICD-10-CM

## 2024-06-17 PROCEDURE — 99213 OFFICE O/P EST LOW 20 MIN: CPT | Performed by: ORTHOPAEDIC SURGERY

## 2024-06-17 PROCEDURE — G8417 CALC BMI ABV UP PARAM F/U: HCPCS | Performed by: ORTHOPAEDIC SURGERY

## 2024-06-17 PROCEDURE — G8427 DOCREV CUR MEDS BY ELIG CLIN: HCPCS | Performed by: ORTHOPAEDIC SURGERY

## 2024-06-17 PROCEDURE — 4004F PT TOBACCO SCREEN RCVD TLK: CPT | Performed by: ORTHOPAEDIC SURGERY

## 2024-06-17 NOTE — PROGRESS NOTES
Soledad Dixon is a 44 y.o. female, who presents   Chief Complaint   Patient presents with    Knee Pain     Left knee pain. Mostly anterior knee pain traveling laterally. Patient was walking on June 7th and has had pain ever since. Pain has slightly improve since initial injury. Better with rest.        HPI:: Soledad was in a meeting 6/7/2024.  She was going down some steps and stepped down with her left leg.  Upon hitting the neck step she experienced severe pain in the anterior left knee.  She has used ice and some ibuprofen.  She did develop a little swelling in front of the kneecap.  She denied any crepitus.  She has no history of previous problems.    Allergies; medications; past medical, surgical, family, and social history; and problem list have been reviewed today and updated as indicated in this encounter - see below following Ortho specifics.    Musculoskeletal: Skin condition gross neurovascular function of the left lower extremity is good.  There is no edema or discoloration around the knee and there is no hyperemia or erythema.  Collateral and cruciate ligaments are stable.  There is no impression of effusion.  There is tenderness palpation anterior at the distal pole of the patella.  There are no defects in the tendon palpable.  There is tenderness to palpation.  She has good strength of extension of the knee.  There is no gross effusion.  There are no signs of meniscal abnormalities.  Her calf is supple with no tenderness.    Radiologic Studies: Imaging of the knee today shows slight medial narrowing with slight medial marginal hypertrophic change.  There is no evidence of fracture.  There is no suggestion of effusion.    ASSESSMENT:  Soledad was seen today for knee pain.    Diagnoses and all orders for this visit:    Acute pain of left knee    Strain of patellar tendon, left, initial encounter     Treatment alternatives were reviewed including medical and physical therapies, injections, and

## 2024-09-13 ENCOUNTER — OFFICE VISIT (OUTPATIENT)
Age: 44
End: 2024-09-13
Payer: COMMERCIAL

## 2024-09-13 VITALS — TEMPERATURE: 98.6 F | WEIGHT: 221 LBS | BODY MASS INDEX: 37.73 KG/M2 | HEIGHT: 64 IN

## 2024-09-13 DIAGNOSIS — S80.02XA PATELLAR CONTUSION, LEFT, INITIAL ENCOUNTER: Primary | ICD-10-CM

## 2024-09-13 DIAGNOSIS — S80.12XA CONTUSION OF LEFT TIBIA: ICD-10-CM

## 2024-09-13 PROCEDURE — 99203 OFFICE O/P NEW LOW 30 MIN: CPT | Performed by: FAMILY MEDICINE

## 2024-09-13 RX ORDER — MELOXICAM 15 MG/1
15 TABLET ORAL DAILY
Qty: 14 TABLET | Refills: 0 | Status: CANCELLED | OUTPATIENT
Start: 2024-09-13 | End: 2024-09-27

## 2024-09-13 ASSESSMENT — ENCOUNTER SYMPTOMS
NAUSEA: 0
COUGH: 0
VOMITING: 0
ABDOMINAL PAIN: 0
CHEST TIGHTNESS: 0
CONSTIPATION: 0
SHORTNESS OF BREATH: 0
DIARRHEA: 0

## 2024-10-07 ENCOUNTER — APPOINTMENT (OUTPATIENT)
Dept: GENERAL RADIOLOGY | Age: 44
End: 2024-10-07
Payer: COMMERCIAL

## 2024-10-07 ENCOUNTER — HOSPITAL ENCOUNTER (EMERGENCY)
Age: 44
Discharge: HOME | End: 2024-10-07
Payer: COMMERCIAL

## 2024-10-07 VITALS
HEART RATE: 80 BPM | OXYGEN SATURATION: 95 % | SYSTOLIC BLOOD PRESSURE: 155 MMHG | DIASTOLIC BLOOD PRESSURE: 98 MMHG | TEMPERATURE: 98 F | RESPIRATION RATE: 18 BRPM

## 2024-10-07 DIAGNOSIS — R07.89 CHEST WALL PAIN: Primary | ICD-10-CM

## 2024-10-07 PROCEDURE — 99211 OFF/OP EST MAY X REQ PHY/QHP: CPT

## 2024-10-07 PROCEDURE — 71101 X-RAY EXAM UNILAT RIBS/CHEST: CPT

## 2024-10-07 ASSESSMENT — PAIN - FUNCTIONAL ASSESSMENT: PAIN_FUNCTIONAL_ASSESSMENT: 0-10

## 2024-10-07 NOTE — ED PROVIDER NOTES
[facility] URGENT CARE  EMERGENCY DEPARTMENT ENCOUNTER        NAME: Soledad Dixon  :  1980  MRN:  [id number]  Date of evaluation: 10/7/2024  Provider: Rigo Long PA-C  PCP: Karlos Dang Jr.,   Note Started : 6:49 PM EDT 10/7/24    Chief Complaint: Cough (Has been coughing  left side of ribs hurt from coughing just wants to check to make sure she did not  break a rib)      This is a 44-year-old female that presents to urgent care complaining of left sided rib cage pain stating that last week she had cough and URI symptoms.  And was on an antibiotic and feels better but she states that she does have some chest wall pain probably from the coughing she went to make sure she did not break a rib.  She denies any other chest pain.  No shortness of breath.  No abdominal pain.  On first contact patient she appears to be in no acute distress.        Review of Systems  Pertinent positives and negatives are stated within HPI, all other systems reviewed and are negative.     Allergies: Bactrim [sulfamethoxazole-trimethoprim], Other, Penicillins, Ciprofloxacin, and Keflex [cephalexin]     --------------------------------------------- PAST HISTORY ---------------------------------------------  Past Medical History:  has a past medical history of H/O dental abscess.    Past Surgical History:  has a past surgical history that includes  section; Dental surgery; other surgical history (Right, 2018); and pr tendon sheath incision (Right, 2018).    Social History:  reports that she has been smoking cigarettes. She has never used smokeless tobacco. She reports that she does not drink alcohol and does not use drugs.    Family History: family history is not on file.     The patient’s home medications have been reviewed.    The nursing notes within the ED encounter have been reviewed.

## 2024-10-08 NOTE — DISCHARGE INSTRUCTIONS
Tylenol 650 to 1000 mg every 8 hours as needed for pain/fever.  Ibuprofen 600 mg every 8 hours as needed for inflammation/pain/fever. Take with food and water.  Topical medications lidocaine patch.

## 2025-01-21 ENCOUNTER — OFFICE VISIT (OUTPATIENT)
Age: 45
End: 2025-01-21
Payer: COMMERCIAL

## 2025-01-21 VITALS — HEIGHT: 64 IN | TEMPERATURE: 98.6 F | WEIGHT: 221 LBS | BODY MASS INDEX: 37.73 KG/M2

## 2025-01-21 DIAGNOSIS — M79.641 RIGHT HAND PAIN: Primary | ICD-10-CM

## 2025-01-21 DIAGNOSIS — M77.8 TENDINITIS OF FINGER OF RIGHT HAND: Primary | ICD-10-CM

## 2025-01-21 PROCEDURE — G8417 CALC BMI ABV UP PARAM F/U: HCPCS | Performed by: ORTHOPAEDIC SURGERY

## 2025-01-21 PROCEDURE — 4004F PT TOBACCO SCREEN RCVD TLK: CPT | Performed by: ORTHOPAEDIC SURGERY

## 2025-01-21 PROCEDURE — G8427 DOCREV CUR MEDS BY ELIG CLIN: HCPCS | Performed by: ORTHOPAEDIC SURGERY

## 2025-01-21 PROCEDURE — 99203 OFFICE O/P NEW LOW 30 MIN: CPT | Performed by: ORTHOPAEDIC SURGERY

## 2025-01-21 RX ORDER — METHYLPREDNISOLONE 4 MG/1
TABLET ORAL
Qty: 1 KIT | Refills: 2 | Status: SHIPPED | OUTPATIENT
Start: 2025-01-21 | End: 2025-01-27

## 2025-01-21 NOTE — PROGRESS NOTES
Chief Complaint   Patient presents with    Finger Pain     Right ring finger pain. Pain and swelling began on Saturday. Describes pain as aching and sharp. States she is unable to  anything. States pain is at its worst when trying to pick things up. Pain level today 4/10       Soledad Dixon is a 44 y.o. year old  female who presents for evaluation of right hand ring finger pain.  she reports this started sat.  she does not remember a specific injury that started the pain.    The injury was none. Her pain is located mainly palm over the mcp joint. The pain is worse with movement and better with rest.  The patient has tried nothing specific.  The treatment has not been effective.  The patient is Right hand dominant.     Past Medical History:   Diagnosis Date    H/O dental abscess      Past Surgical History:   Procedure Laterality Date     SECTION      DENTAL SURGERY      OTHER SURGICAL HISTORY Right 2018    tenovaginotomy, right ring finger    [place] TENDON SHEATH INCISION Right 2018    TENOVAGINOTOMY RIGHT RING FINGER performed by CLARK Ennis DO at [facility] OR       Current Outpatient Medications:     methylPREDNISolone (MEDROL, KIANA,) 4 MG tablet, Take by mouth., Disp: 1 kit, Rfl: 2    tiZANidine (ZANAFLEX) 4 MG tablet, Take 1 tablet by mouth 2 times daily as needed (pain/spasm (may cause drowsiness)), Disp: 12 tablet, Rfl: 0    omeprazole (PRILOSEC) 20 MG delayed release capsule, Take 1 capsule by mouth daily, Disp: , Rfl:     levothyroxine (SYNTHROID) 25 MCG tablet, Take 1 tablet by mouth Daily, Disp: , Rfl:     escitalopram (LEXAPRO) 20 MG tablet, Take 1 tablet by mouth daily, Disp: , Rfl:   Allergies   Allergen Reactions    Bactrim [Sulfamethoxazole-Trimethoprim] Anaphylaxis    Other Anaphylaxis     Seafood      Penicillins Anaphylaxis    Ciprofloxacin Hives    Keflex [Cephalexin] Hives     Social History     Socioeconomic History    Marital status:      Spouse

## 2025-02-15 ENCOUNTER — HOSPITAL ENCOUNTER (OUTPATIENT)
Dept: MAMMOGRAPHY | Age: 45
Discharge: HOME | End: 2025-02-17
Payer: COMMERCIAL

## 2025-02-15 DIAGNOSIS — Z12.31 ENCOUNTER FOR SCREENING MAMMOGRAM FOR MALIGNANT NEOPLASM OF BREAST: ICD-10-CM

## 2025-02-15 PROCEDURE — 77063 BREAST TOMOSYNTHESIS BI: CPT

## 2025-08-06 ENCOUNTER — TELEPHONE (OUTPATIENT)
Dept: ORTHOPEDIC SURGERY | Age: 45
End: 2025-08-06

## 2025-08-06 ENCOUNTER — OFFICE VISIT (OUTPATIENT)
Dept: ORTHOPEDIC SURGERY | Age: 45
End: 2025-08-06
Payer: COMMERCIAL

## 2025-08-06 DIAGNOSIS — M65.321 TRIGGER FINGER, RIGHT INDEX FINGER: Primary | ICD-10-CM

## 2025-08-06 PROCEDURE — 4004F PT TOBACCO SCREEN RCVD TLK: CPT | Performed by: ORTHOPAEDIC SURGERY

## 2025-08-06 PROCEDURE — 99204 OFFICE O/P NEW MOD 45 MIN: CPT | Performed by: ORTHOPAEDIC SURGERY

## 2025-08-06 PROCEDURE — G8427 DOCREV CUR MEDS BY ELIG CLIN: HCPCS | Performed by: ORTHOPAEDIC SURGERY

## 2025-08-06 PROCEDURE — G8417 CALC BMI ABV UP PARAM F/U: HCPCS | Performed by: ORTHOPAEDIC SURGERY

## 2025-08-13 ENCOUNTER — OFFICE VISIT (OUTPATIENT)
Dept: ORTHOPEDIC SURGERY | Age: 45
End: 2025-08-13

## 2025-08-13 VITALS — WEIGHT: 221 LBS | BODY MASS INDEX: 37.73 KG/M2 | HEIGHT: 64 IN

## 2025-08-13 DIAGNOSIS — M65.321 TRIGGER FINGER, RIGHT INDEX FINGER: Primary | ICD-10-CM

## (undated) DEVICE — GOWN SURG XL SMS FAB NONREINFORCED RAGLAN SLV HK LOOP CLSR

## (undated) DEVICE — GLOVE SURG 7 LTX TRIFLEX WIDE FINGER PWDR

## (undated) DEVICE — TOWEL OR BLUEE 16X26IN ST 8 PACK ORB08 16X26ORTWL

## (undated) DEVICE — CUFF TOURNIQUET 18 SNG BLADDER DUAL PORT

## (undated) DEVICE — BANDAGE COMPR W4XL108IN WHT LAYERED NO CLSR SYN RUB ESMARCH

## (undated) DEVICE — SOLUTION IV IRRIG POUR BRL 0.9% SODIUM CHL 2F7124

## (undated) DEVICE — ENCORE® LATEX TEXTURED SIZE 8, STERILE LATEX POWDER-FREE SURGICAL GLOVE: Brand: ENCORE

## (undated) DEVICE — BASIC PACK: Brand: CONVERTORS

## (undated) DEVICE — TIBURON EXTREMITY SHEET: Brand: CONVERTORS

## (undated) DEVICE — CHLORAPREP 26ML ORANGE

## (undated) DEVICE — 1810 FOAM BLOCK NEEDLE COUNTER: Brand: DEVON

## (undated) DEVICE — SUTURE PROL SZ 6-0 L18IN NONABSORBABLE BLU L16MM PS-3 3/8 8680G

## (undated) DEVICE — DRESSING GZ XRFRM 4X4(25/BX 6BX/CS)

## (undated) DEVICE — HANDLE CVR PATENTED RETENTION DISC STRL LIGHT SHLD

## (undated) DEVICE — BANDAGE GZ W2XL75IN ST RAYON POLY CNFRM STRTCH LTWT

## (undated) DEVICE — 20 ML SYRINGE REGULAR TIP: Brand: MONOJECT

## (undated) DEVICE — SOLUTION SURG PREP ANTIMICROBIAL 4 OZ SKIN WND EXIDINE

## (undated) DEVICE — SOLUTION IRRIG 1000ML 09% SOD CHL USP PIC PLAS CONTAINER

## (undated) DEVICE — HOOK LOCK LATEX FREE ELASTIC BANDAGE 2INX5YD

## (undated) DEVICE — INTENDED FOR TISSUE SEPARATION, AND OTHER PROCEDURES THAT REQUIRE A SHARP SURGICAL BLADE TO PUNCTURE OR CUT.: Brand: BARD-PARKER ® STAINLESS STEEL BLADES

## (undated) DEVICE — GAUZE,SPONGE,4"X4",16PLY,XRAY,STRL,LF: Brand: MEDLINE

## (undated) DEVICE — PEN: MARKING STD 100/CS: Brand: MEDICAL ACTION INDUSTRIES